# Patient Record
Sex: FEMALE | Race: WHITE
[De-identification: names, ages, dates, MRNs, and addresses within clinical notes are randomized per-mention and may not be internally consistent; named-entity substitution may affect disease eponyms.]

---

## 2017-10-16 ENCOUNTER — HOSPITAL ENCOUNTER (OUTPATIENT)
Dept: HOSPITAL 92 - CT | Age: 52
Discharge: HOME | End: 2017-10-16
Attending: FAMILY MEDICINE
Payer: COMMERCIAL

## 2017-10-16 DIAGNOSIS — Z98.890: ICD-10-CM

## 2017-10-16 DIAGNOSIS — L90.5: ICD-10-CM

## 2017-10-16 DIAGNOSIS — R10.9: Primary | ICD-10-CM

## 2017-10-16 DIAGNOSIS — K57.90: ICD-10-CM

## 2017-10-16 DIAGNOSIS — M43.16: ICD-10-CM

## 2017-10-16 DIAGNOSIS — I31.3: ICD-10-CM

## 2017-10-16 DIAGNOSIS — R91.1: ICD-10-CM

## 2017-10-16 PROCEDURE — 74150 CT ABDOMEN W/O CONTRAST: CPT

## 2017-10-16 NOTE — CT
CT ABDOMEN WITHOUT IV CONTRAST:

 

10/16/2017

 

HISTORY:

Hernia.  The patient states abdominal pain and a painful sensation of something popping out of the a
bdomen when she lifts something heavy.  The patient has a history of a carcinoid tumor.

 

FINDINGS:

There is a small pericardial effusion.

 

There is a less than 4 mm pulmonary nodule seen at the right lung base.  The lung bases are otherwis
e clear.

 

Post surgical changes of the stomach are noted.

 

Splenic and hepatic granulomata are present.

 

There is a nonobstructing and approximately 2 to 3 mm calculus seen within the inferior pole left ki
dney.  No additional renal calculus is present.

 

The appendix is visualized and is normal in caliber.

 

Within the anterior aspect of the pelvis, anteriorly, there is an area of increased density, which i
s in the region of prior post surgical change, which has a more mass-like configuration and measures
 6 cm x 3 cm, which also extends laterally and anteriorly on the right.  This may be related to a co
mbination of mesh material, as well as a granulomatous type reaction and scarring.  However, given t
he mass-like appearance, continued followup is recommended.  Just anterior to this region, there is 
a small amount of fluid within the subcutaneous soft tissues, in an infraumbilical location.

 

There is colonic diverticulosis.

 

The pancreas and bilateral adrenal glands demonstrate a grossly normal nonenhanced CT appearance.

 

On the lateral view, there is a grade 1 anterolisthesis of L4 on L5 and probably trace anterolisthes
is of L5 on S1 with degenerative changes at the lumbosacral junction.

 

Vascular calcifications are seen in the abdominal aorta and iliac arteries.

 

IMPRESSION:

1.  Midline scarring is present within the abdomen, near the level of the umbilicus.  There is a lob
ulated area of increased density within the anterior most aspect junction of the anterior abdomen an
d pelvis, as described above, which probably represents an area of scarring and granulomatous type r
eaction; however, given the mass like appearance of this structure, continued followup is recommende
d.  Follow-up CT scan examination in four to six months is recommended, with IV contrast.

 

2.  Small pericardial effusion.

 

3.  Tiny, less than 4 mm pulmonary nodule, right lung base.

 

4.  Post surgical changes in the region of the stomach.

 

5.  There are no findings to suggest a hiatal hernia, and no abdominal wall hernia is visualized.

 

6.  Small amount of fluid seen in a periumbilical region in the subcutaneous soft tissues.

 

7.  Grade 1 anterolisthesis of L4 on L5 with suggestion of trace grade 1 anterolisthesis of L5 on S1
.

 

8.  Colonic diverticulosis.

 

POS: [facility]

## 2017-11-16 ENCOUNTER — HOSPITAL ENCOUNTER (OUTPATIENT)
Dept: HOSPITAL 92 - MAMMO | Age: 52
Discharge: HOME | End: 2017-11-16
Attending: FAMILY MEDICINE
Payer: COMMERCIAL

## 2017-11-16 DIAGNOSIS — Z12.31: Primary | ICD-10-CM

## 2017-11-16 DIAGNOSIS — Z53.8: ICD-10-CM

## 2017-12-27 ENCOUNTER — HOSPITAL ENCOUNTER (OUTPATIENT)
Dept: HOSPITAL 92 - BICMAMMO | Age: 52
Discharge: HOME | End: 2017-12-27
Attending: FAMILY MEDICINE
Payer: COMMERCIAL

## 2017-12-27 DIAGNOSIS — Z85.89: ICD-10-CM

## 2017-12-27 DIAGNOSIS — D24.2: ICD-10-CM

## 2017-12-27 DIAGNOSIS — N63.20: Primary | ICD-10-CM

## 2017-12-27 DIAGNOSIS — N60.01: ICD-10-CM

## 2017-12-27 DIAGNOSIS — N63.10: ICD-10-CM

## 2017-12-27 PROCEDURE — 77066 DX MAMMO INCL CAD BI: CPT

## 2017-12-27 PROCEDURE — G0279 TOMOSYNTHESIS, MAMMO: HCPCS

## 2017-12-27 PROCEDURE — G0204 DX MAMMO INCL CAD BI: HCPCS

## 2018-02-15 ENCOUNTER — HOSPITAL ENCOUNTER (OUTPATIENT)
Dept: HOSPITAL 92 - CT | Age: 53
Discharge: HOME | End: 2018-02-15
Attending: FAMILY MEDICINE
Payer: COMMERCIAL

## 2018-02-15 DIAGNOSIS — R93.5: Primary | ICD-10-CM

## 2018-02-15 PROCEDURE — 74160 CT ABDOMEN W/CONTRAST: CPT

## 2018-02-15 PROCEDURE — 74177 CT ABD & PELVIS W/CONTRAST: CPT

## 2018-02-15 NOTE — CT
CT ABDOMEN AND PELVIS WITH IV CONTRAST:

 

Date:  02/15/18 

 

HISTORY:  

52-year-old female with history of abdominal surgery in 2008. Exam requested to follow finding on CT 
scan of 10/16/17 in the lower anterior abdomen. 

 

FINDINGS:

 

Comparison made with exam of 10/16/17. 

 

The 3-4 mm nodule in the right lower lobe is stable. A small pericardial effusion is again seen. No f
ree air, free fluid, or lymphadenopathy is seen in the abdomen or pelvis. No calcified gallstones are
 noted. There are calcified granulomas in the spleen. The liver, pancreas, adrenal glands, and kidney
s are normal. 

 

The previously noted area of increased density in the region of prior postsurgical change within the 
anterior aspect of the lower abdomen/upper pelvis measuring 6.0 x 3.0 x 3.0 cm is stable and is likel
y related to a combination of mesh material with granulomatous type reaction and scarring. Just anter
ior to this, a small amount of fluid in the subcutaneous soft tissues in an infraumbilical location i
s stable. 

 

There are vascular calcifications without evidence of aneurysmal dilatation of the abdominal aorta. A
 normal appearing appendix is present. There is colonic diverticulosis. Postop changes in the stomach
 are also again noted. 

 

IMPRESSION: 

Stable findings since 10/16/17. A follow-up CT scan of the abdomen and pelvis is recommended in 6 mon
ths. 

 

 

 

POS: [facility]

## 2018-08-15 ENCOUNTER — HOSPITAL ENCOUNTER (OUTPATIENT)
Dept: HOSPITAL 92 - BICCT | Age: 53
Discharge: HOME | End: 2018-08-15
Attending: FAMILY MEDICINE
Payer: COMMERCIAL

## 2018-08-15 DIAGNOSIS — Z98.890: ICD-10-CM

## 2018-08-15 DIAGNOSIS — R10.9: Primary | ICD-10-CM

## 2018-08-15 DIAGNOSIS — I70.90: ICD-10-CM

## 2018-08-15 PROCEDURE — 74177 CT ABD & PELVIS W/CONTRAST: CPT

## 2019-12-30 ENCOUNTER — HOSPITAL ENCOUNTER (OUTPATIENT)
Dept: HOSPITAL 92 - BICMAMMO | Age: 54
Discharge: HOME | End: 2019-12-30
Attending: FAMILY MEDICINE
Payer: COMMERCIAL

## 2019-12-30 DIAGNOSIS — Z85.89: ICD-10-CM

## 2019-12-30 DIAGNOSIS — Z12.31: Primary | ICD-10-CM

## 2019-12-30 PROCEDURE — 77067 SCR MAMMO BI INCL CAD: CPT

## 2019-12-30 NOTE — MMO
Bilateral MAMMO Bilat Screen [facility].

 

CLINICAL HISTORY:

Patient is 54 years old and is seen for screening. The patient has no family

history of breast cancer.  The patient has a history of other cancer at age 31.

 

VIEWS:

The views performed were:  bilateral craniocaudal and bilateral mediolateral

oblique.

 

FILMS COMPARED:

The present examination has been compared to a prior imaging study performed at

[facility] on 12/27/2017.

 

This study has been interpreted with the assistance of computer-aided detection.

 

MAMMOGRAM FINDINGS:

There are scattered fibroglandular densities.

 

There are no suspicious masses, suspicious calcifications, or new areas of

architectural distortion.

 

IMPRESSION:

THERE IS NO MAMMOGRAPHIC EVIDENCE OF MALIGNANCY.

 

A ROUTINE FOLLOW-UP MAMMOGRAM IN 1 YEAR IS RECOMMENDED.

 

ACR BI-RADS Category 1 - Negative

 

MAMMOGRAPHY NOTE:

 1. A negative mammogram report should not delay a biopsy if a dominant of

 clinically suspicious mass is present.

 2. Approximately 10% to 15% of breast cancers are not detected by

 mammography.

 3. Adenosis and dense breasts may obscure an underlying neoplasm.

 

 

Reported by: ZARI ROTHMAN MD

Electonically Signed: [id number]